# Patient Record
Sex: MALE | Race: NATIVE HAWAIIAN OR OTHER PACIFIC ISLANDER | Employment: STUDENT | URBAN - METROPOLITAN AREA
[De-identification: names, ages, dates, MRNs, and addresses within clinical notes are randomized per-mention and may not be internally consistent; named-entity substitution may affect disease eponyms.]

---
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
## 2021-04-20 ENCOUNTER — IMMUNIZATION (OUTPATIENT)
Dept: PEDIATRICS | Facility: CLINIC | Age: 21
End: 2021-04-20
Payer: COMMERCIAL

## 2021-04-20 PROCEDURE — 91300 PR COVID VAC PFIZER DIL RECON 30 MCG/0.3 ML IM: CPT

## 2021-04-20 PROCEDURE — 0001A PR COVID VAC PFIZER DIL RECON 30 MCG/0.3 ML IM: CPT

## 2021-04-24 ENCOUNTER — HEALTH MAINTENANCE LETTER (OUTPATIENT)
Age: 21
End: 2021-04-24

## 2021-05-11 ENCOUNTER — IMMUNIZATION (OUTPATIENT)
Dept: PEDIATRICS | Facility: CLINIC | Age: 21
End: 2021-05-11
Attending: INTERNAL MEDICINE
Payer: COMMERCIAL

## 2021-05-11 PROCEDURE — 91300 PR COVID VAC PFIZER DIL RECON 30 MCG/0.3 ML IM: CPT

## 2021-05-11 PROCEDURE — 0002A PR COVID VAC PFIZER DIL RECON 30 MCG/0.3 ML IM: CPT

## 2021-10-09 ENCOUNTER — HEALTH MAINTENANCE LETTER (OUTPATIENT)
Age: 21
End: 2021-10-09

## 2022-05-21 ENCOUNTER — HEALTH MAINTENANCE LETTER (OUTPATIENT)
Age: 22
End: 2022-05-21

## 2022-09-17 ENCOUNTER — HEALTH MAINTENANCE LETTER (OUTPATIENT)
Age: 22
End: 2022-09-17

## 2023-06-04 ENCOUNTER — HEALTH MAINTENANCE LETTER (OUTPATIENT)
Age: 23
End: 2023-06-04

## 2023-09-29 ENCOUNTER — OFFICE VISIT (OUTPATIENT)
Dept: URGENT CARE | Facility: URGENT CARE | Age: 23
End: 2023-09-29
Payer: OTHER MISCELLANEOUS

## 2023-09-29 VITALS
DIASTOLIC BLOOD PRESSURE: 95 MMHG | SYSTOLIC BLOOD PRESSURE: 134 MMHG | WEIGHT: 157.6 LBS | RESPIRATION RATE: 16 BRPM | HEART RATE: 57 BPM | TEMPERATURE: 97.6 F | OXYGEN SATURATION: 97 %

## 2023-09-29 DIAGNOSIS — S61.213A LACERATION OF LEFT MIDDLE FINGER WITHOUT FOREIGN BODY WITHOUT DAMAGE TO NAIL, INITIAL ENCOUNTER: Primary | ICD-10-CM

## 2023-09-29 PROCEDURE — 12001 RPR S/N/AX/GEN/TRNK 2.5CM/<: CPT | Mod: F2 | Performed by: PHYSICIAN ASSISTANT

## 2023-09-29 NOTE — PATIENT INSTRUCTIONS
(J39.678L) Laceration of left middle finger without foreign body without damage to nail, initial encounter  (primary encounter diagnosis)  Comment:   Plan: REPAIR SUPERFICIAL, WOUND BODY < =2.5CM            Local wound care daily.  Keep covered during the day with bandage for the next 10 days, you may leave open at night starting Sunday night.  Change dressing/bandage daily (soak in warm soapy water, rinse, pat dry and apply bacitracin and bandage).    Return to clinic if wound develops signs of infection such as redness, purulent (yellow) drainage or increasing pain and or fevers.      See workability note

## 2023-09-29 NOTE — LETTER
REPORT OF WORK COMP    [address] [address]      PATIENT DATA    Employee Name: Cali Burgess      : 2000     #: [id number]    Work related injury: YesToday's date: 2023  Date of injury: 2023  Date of first visit:2023    PROVIDER EVALUATION: Please fill in as needed.  Please give copy to employee for employer.    1. Diagnosis: left 3rd finger laceration    2. Treatment: 2 sutures placed.  3. Medication: tylenol as needed for discomfort  NOTE: When ordering a medication, [place] Rules require Work Comp or WC on prescriptions.    4. Return to work date: Monday 10/2/2023   ** WITH RESTRICTIONS? As tolerated with bandage on left finger.  Keep wound clean and covered until sutures are removed in 12 days.        RESTRICTIONS: Unlimited unless listed.  Restrictions apply to home and leisure also.  If work restrictions is not available, the employee is totally disabled.    Provider comments: suture removal in 12 days    Medical Examiner: Fely DURAND, PA-C            License or registration: [id number]    Next appointment: 12 days for suture removal.    CC: Employer, Managed Care Plan/Payor, Patient

## 2023-09-29 NOTE — PROGRESS NOTES
Patient presents with:  Work Comp: Piece of pipe cut left middle finger. DOI:09/29/2023 around 1PM.    (S61.213A) Laceration of left middle finger without foreign body without damage to nail, initial encounter  (primary encounter diagnosis)  Comment:   Plan: REPAIR SUPERFICIAL, WOUND BODY < =2.5CM          Return for suture removal in 12 days.    Local wound care daily.  Keep covered during the day with bandage for the next 10 days, you may leave open at night starting Sunday night.  Change dressing/bandage daily (soak in warm soapy water, rinse, pat dry and apply bacitracin and bandage).    Return to clinic if wound develops signs of infection such as redness, purulent (yellow) drainage or increasing pain and or fevers.      See workability note            SUBJECTIVE:   Cali Burgess is a 23 year old male who presents today with a laceration on his left third finger.  Laceration happened at work, a copper pipe cut his finger. It was a new copper pipe. Denies any numbness tingling or weakness in the extremity.  Last tetanus was in 2015.      PMH Leukemia as a child.      ROS:    10 point ROS of systems including Constitutional, Eyes, Respiratory, Cardiovascular, Gastroenterology, Genitourinary, Integumentary, Muscularskeletal, Psychiatric ,neurological were all negative except for pertinent positives noted in my HPI       OBJECTIVE:  BP (!) 134/95 (BP Location: Left arm, Patient Position: Sitting, Cuff Size: Adult Regular)   Pulse 57   Temp 97.6  F (36.4  C) (Tympanic)   Resp 16   Wt 71.5 kg (157 lb 9.6 oz)   SpO2 97%   Physical Exam:  GENERAL APPEARANCE: healthy, alert and no distress  Extremities: no peripheral edema or tenderness, peripheral pulses normal  NEURO: Normal strength and tone, sensory exam grossly normal,  normal speech and mentation  SKIN: Laceration right third finger palmar aspect at PIP.    Size of laceration: 1.5 centimeters  Characteristics of the laceration: bleeding- mild and extends into  subcutaneous fat  Tendon function intact: yes  Sensation to light touch intact: yes  Pulses intact: yes  Picture included in patient's chart: no    Assessment:  Laceration of left middle finger without foreign body without damage to nail, initial encounter    PLAN:  PROCEDURE NOTE::  Wound was locally injected with 1 cc's of Lidocaine 1% plain  Wound cleaned with betadine/saline solution  Wound irrigated  Laceration was closed using 2 4-0 nylon interrupted sutures  After care instructions:  Keep wound clean and dry for the next 24-48 hours  Sutures out in 12 days

## 2023-10-11 ENCOUNTER — ALLIED HEALTH/NURSE VISIT (OUTPATIENT)
Dept: FAMILY MEDICINE | Facility: CLINIC | Age: 23
End: 2023-10-11
Payer: OTHER MISCELLANEOUS

## 2023-10-11 DIAGNOSIS — Z48.02 ENCOUNTER FOR REMOVAL OF SUTURES: Primary | ICD-10-CM

## 2023-10-11 PROCEDURE — 99207 PR NO CHARGE NURSE ONLY: CPT

## 2023-10-11 NOTE — PROGRESS NOTES
Cali Burgess presents to the clinic for removal of sutures. The patient has had sutures in place for 12 days.       RN notes A/P from urgent care note on 9/29/23:   Assessment:  Laceration of left middle finger without foreign body without damage to nail, initial encounter     PLAN:  PROCEDURE NOTE::  Wound was locally injected with 1 cc's of Lidocaine 1% plain  Wound cleaned with betadine/saline solution  Wound irrigated  Laceration was closed using 2 4-0 nylon interrupted sutures  After care instructions:  Keep wound clean and dry for the next 24-48 hours  Sutures out in 12 days    There has been no patient reported signs or symptoms of infection or drainage. 2  sutures are seen and located on the left middle finger. Tetanus status is up to date. All sutures were easily removed today. Routine wound care discussed by the RN or provider. The patient will follow up as needed.    Kristen Greco RN on 10/11/2023 at 8:55 AM

## 2024-07-13 ENCOUNTER — HEALTH MAINTENANCE LETTER (OUTPATIENT)
Age: 24
End: 2024-07-13

## 2025-07-19 ENCOUNTER — HEALTH MAINTENANCE LETTER (OUTPATIENT)
Age: 25
End: 2025-07-19